# Patient Record
Sex: MALE | URBAN - METROPOLITAN AREA
[De-identification: names, ages, dates, MRNs, and addresses within clinical notes are randomized per-mention and may not be internally consistent; named-entity substitution may affect disease eponyms.]

---

## 2023-02-05 PROBLEM — K13.0 CELLULITIS, LIP: Status: ACTIVE | Noted: 2023-02-05

## 2023-02-05 PROBLEM — L60.0 INGROWN TOENAIL: Status: ACTIVE | Noted: 2023-02-05

## 2023-02-05 PROBLEM — R41.840 ATTENTION DISTURBANCE: Status: ACTIVE | Noted: 2023-02-05

## 2023-02-05 PROBLEM — L03.039 PARONYCHIA OF TOE: Status: ACTIVE | Noted: 2023-02-05

## 2023-02-05 PROBLEM — L30.9 DERMATITIS: Status: ACTIVE | Noted: 2023-02-05

## 2023-02-05 RX ORDER — HYDROCORTISONE VALERATE CREAM 2 MG/G
CREAM TOPICAL
COMMUNITY
Start: 2020-05-26 | End: 2023-03-10 | Stop reason: ALTCHOICE

## 2023-02-05 RX ORDER — DEXTROAMPHETAMINE SACCHARATE, AMPHETAMINE ASPARTATE, DEXTROAMPHETAMINE SULFATE AND AMPHETAMINE SULFATE 2.5; 2.5; 2.5; 2.5 MG/1; MG/1; MG/1; MG/1
10 TABLET ORAL DAILY
COMMUNITY
Start: 2019-07-01

## 2023-02-05 RX ORDER — ESCITALOPRAM OXALATE 10 MG/1
1 TABLET ORAL DAILY
COMMUNITY
Start: 2019-11-26

## 2023-02-05 RX ORDER — NYSTATIN 100000 U/G
CREAM TOPICAL 2 TIMES DAILY
COMMUNITY
Start: 2020-05-13 | End: 2023-03-10

## 2023-03-10 ENCOUNTER — OFFICE VISIT (OUTPATIENT)
Dept: PRIMARY CARE | Facility: CLINIC | Age: 23
End: 2023-03-10
Payer: COMMERCIAL

## 2023-03-10 VITALS
BODY MASS INDEX: 27.12 KG/M2 | HEIGHT: 67 IN | DIASTOLIC BLOOD PRESSURE: 68 MMHG | SYSTOLIC BLOOD PRESSURE: 124 MMHG | WEIGHT: 172.8 LBS | HEART RATE: 88 BPM

## 2023-03-10 DIAGNOSIS — Z00.00 HEALTHCARE MAINTENANCE: Primary | ICD-10-CM

## 2023-03-10 DIAGNOSIS — R05.2 SUBACUTE COUGH: ICD-10-CM

## 2023-03-10 PROBLEM — K13.0 CELLULITIS, LIP: Status: RESOLVED | Noted: 2023-02-05 | Resolved: 2023-03-10

## 2023-03-10 PROBLEM — L03.039 PARONYCHIA OF TOE: Status: RESOLVED | Noted: 2023-02-05 | Resolved: 2023-03-10

## 2023-03-10 PROBLEM — L60.0 INGROWN TOENAIL: Status: RESOLVED | Noted: 2023-02-05 | Resolved: 2023-03-10

## 2023-03-10 PROBLEM — L30.9 DERMATITIS: Status: RESOLVED | Noted: 2023-02-05 | Resolved: 2023-03-10

## 2023-03-10 PROCEDURE — 1036F TOBACCO NON-USER: CPT | Performed by: FAMILY MEDICINE

## 2023-03-10 PROCEDURE — 99395 PREV VISIT EST AGE 18-39: CPT | Performed by: FAMILY MEDICINE

## 2023-03-10 RX ORDER — DEXTROAMPHETAMINE SACCHARATE, AMPHETAMINE ASPARTATE MONOHYDRATE, DEXTROAMPHETAMINE SULFATE AND AMPHETAMINE SULFATE 5; 5; 5; 5 MG/1; MG/1; MG/1; MG/1
20 CAPSULE, EXTENDED RELEASE ORAL EVERY MORNING
COMMUNITY

## 2023-03-10 RX ORDER — ESCITALOPRAM OXALATE 20 MG/1
20 TABLET ORAL DAILY
COMMUNITY

## 2023-03-10 ASSESSMENT — PATIENT HEALTH QUESTIONNAIRE - PHQ9
SUM OF ALL RESPONSES TO PHQ9 QUESTIONS 1 AND 2: 2
1. LITTLE INTEREST OR PLEASURE IN DOING THINGS: SEVERAL DAYS
2. FEELING DOWN, DEPRESSED OR HOPELESS: SEVERAL DAYS

## 2023-03-10 ASSESSMENT — ENCOUNTER SYMPTOMS
APPETITE CHANGE: 0
COLOR CHANGE: 0
CHILLS: 0
COUGH: 0
ARTHRALGIAS: 0
PALPITATIONS: 0
FEVER: 0
ACTIVITY CHANGE: 0
DIARRHEA: 0
FACIAL SWELLING: 0
WHEEZING: 0
EYE DISCHARGE: 0
CONSTIPATION: 0
CONFUSION: 0

## 2023-03-10 NOTE — PROGRESS NOTES
"Subjective   Patient ID: Ezio Abreu is a 22 y.o. male who presents for Annual Exam.    HPI   He comes in for physical exam.  Review of Systems   Constitutional:  Negative for activity change, appetite change, chills and fever.   HENT:  Negative for congestion, ear pain and facial swelling.    Eyes:  Negative for discharge.   Respiratory:  Negative for cough and wheezing.    Cardiovascular:  Negative for chest pain, palpitations and leg swelling.   Gastrointestinal:  Negative for constipation and diarrhea.   Musculoskeletal:  Negative for arthralgias.   Skin:  Negative for color change and pallor.   Neurological:  Negative for syncope.   Psychiatric/Behavioral:  Negative for confusion.        Objective   /68 (BP Location: Right arm)   Pulse 88   Ht 1.695 m (5' 6.75\")   Wt 78.4 kg (172 lb 12.8 oz)   BMI 27.27 kg/m²   BSA Body surface area is 1.92 meters squared.      Physical Exam  Constitutional:       General: He is not in acute distress.     Appearance: Normal appearance. He is not toxic-appearing.   HENT:      Head: Normocephalic.      Right Ear: Tympanic membrane, ear canal and external ear normal.      Left Ear: Tympanic membrane, ear canal and external ear normal.      Nose: Nose normal.      Mouth/Throat:      Pharynx: Oropharynx is clear.   Eyes:      Conjunctiva/sclera: Conjunctivae normal.      Pupils: Pupils are equal, round, and reactive to light.   Cardiovascular:      Rate and Rhythm: Normal rate and regular rhythm.      Pulses: Normal pulses.      Heart sounds: Normal heart sounds.   Pulmonary:      Effort: No respiratory distress.      Breath sounds: No wheezing, rhonchi or rales.   Abdominal:      General: Bowel sounds are normal. There is no distension.      Palpations: Abdomen is soft.      Tenderness: There is no abdominal tenderness.   Musculoskeletal:         General: No swelling or tenderness.      Cervical back: No tenderness.   Skin:     Findings: No lesion or rash. "   Neurological:      General: No focal deficit present.      Mental Status: He is alert and oriented to person, place, and time. Mental status is at baseline.      Gait: Gait normal.   Psychiatric:         Mood and Affect: Mood normal.         Behavior: Behavior normal.         Thought Content: Thought content normal.         Judgment: Judgment normal.       No visits with results within 1 Year(s) from this visit.   Latest known visit with results is:   No results found for any previous visit.     Current Outpatient Medications on File Prior to Visit   Medication Sig Dispense Refill    amphetamine-dextroamphetamine (Adderall) 10 mg tablet Take 1 tablet (10 mg) by mouth once daily.      amphetamine-dextroamphetamine XR (Adderall XR) 20 mg 24 hr capsule Take 1 capsule (20 mg) by mouth once daily in the morning. Do not crush or chew.      escitalopram (Lexapro) 10 mg tablet Take 1 tablet (10 mg) by mouth once daily.      escitalopram (Lexapro) 20 mg tablet Take 1 tablet (20 mg) by mouth once daily.      [DISCONTINUED] hydrocortisone (West-Mariusz) 0.2 % cream Apply sparingly to affected area 2-3 times daily.      [DISCONTINUED] nystatin (Mycostatin) cream twice a day. Apply a thin layer to affected area and rub in well twice daily.       No current facility-administered medications on file prior to visit.     No images are attached to the encounter.            Assessment/Plan   Problem List Items Addressed This Visit    None  Visit Diagnoses       Healthcare maintenance    -  Primary    Relevant Orders    Follow Up In [facility] - PCP    Subacute cough        Relevant Orders    XR chest 2 views

## 2023-12-26 ENCOUNTER — TELEPHONE (OUTPATIENT)
Dept: PRIMARY CARE | Facility: CLINIC | Age: 23
End: 2023-12-26
Payer: COMMERCIAL

## 2023-12-26 DIAGNOSIS — R41.840 ATTENTION DISTURBANCE: ICD-10-CM

## 2023-12-26 NOTE — TELEPHONE ENCOUNTER
Looking for a psychiatrist in the area. Needs a referral.     Out of Adderral due to NP leaving current office. Last seen by them four months ago. Unable to get an appointment - won't return his call.     Let me know of someone that you would have in mind.

## 2024-01-10 ENCOUNTER — APPOINTMENT (OUTPATIENT)
Dept: PRIMARY CARE | Facility: CLINIC | Age: 24
End: 2024-01-10
Payer: COMMERCIAL

## 2025-03-12 ENCOUNTER — TELEPHONE (OUTPATIENT)
Dept: PRIMARY CARE | Facility: CLINIC | Age: 25
End: 2025-03-12
Payer: COMMERCIAL

## 2025-03-13 DIAGNOSIS — Z00.00 HEALTHCARE MAINTENANCE: ICD-10-CM

## 2025-03-27 LAB
ALBUMIN SERPL-MCNC: 4.8 G/DL (ref 3.6–5.1)
ALP SERPL-CCNC: 51 U/L (ref 36–130)
ALT SERPL-CCNC: 54 U/L (ref 9–46)
ANION GAP SERPL CALCULATED.4IONS-SCNC: 11 MMOL/L (CALC) (ref 7–17)
AST SERPL-CCNC: 29 U/L (ref 10–40)
BASOPHILS # BLD AUTO: 70 CELLS/UL (ref 0–200)
BASOPHILS NFR BLD AUTO: 0.8 %
BILIRUB SERPL-MCNC: 0.7 MG/DL (ref 0.2–1.2)
BUN SERPL-MCNC: 17 MG/DL (ref 7–25)
CALCIUM SERPL-MCNC: 9.5 MG/DL (ref 8.6–10.3)
CHLORIDE SERPL-SCNC: 103 MMOL/L (ref 98–110)
CHOLEST SERPL-MCNC: 241 MG/DL
CHOLEST/HDLC SERPL: 6.2 (CALC)
CO2 SERPL-SCNC: 25 MMOL/L (ref 20–32)
CREAT SERPL-MCNC: 1.01 MG/DL (ref 0.6–1.24)
EGFRCR SERPLBLD CKD-EPI 2021: 108 ML/MIN/1.73M2
EOSINOPHIL # BLD AUTO: 475 CELLS/UL (ref 15–500)
EOSINOPHIL NFR BLD AUTO: 5.4 %
ERYTHROCYTE [DISTWIDTH] IN BLOOD BY AUTOMATED COUNT: 12.9 % (ref 11–15)
GLUCOSE SERPL-MCNC: 87 MG/DL (ref 65–99)
HCT VFR BLD AUTO: 48.1 % (ref 38.5–50)
HDLC SERPL-MCNC: 39 MG/DL
HGB BLD-MCNC: 15.8 G/DL (ref 13.2–17.1)
LDLC SERPL CALC-MCNC: 158 MG/DL (CALC)
LYMPHOCYTES # BLD AUTO: 2904 CELLS/UL (ref 850–3900)
LYMPHOCYTES NFR BLD AUTO: 33 %
MCH RBC QN AUTO: 28.3 PG (ref 27–33)
MCHC RBC AUTO-ENTMCNC: 32.8 G/DL (ref 32–36)
MCV RBC AUTO: 86.2 FL (ref 80–100)
MONOCYTES # BLD AUTO: 678 CELLS/UL (ref 200–950)
MONOCYTES NFR BLD AUTO: 7.7 %
NEUTROPHILS # BLD AUTO: 4673 CELLS/UL (ref 1500–7800)
NEUTROPHILS NFR BLD AUTO: 53.1 %
NONHDLC SERPL-MCNC: 202 MG/DL (CALC)
PLATELET # BLD AUTO: 253 THOUSAND/UL (ref 140–400)
PMV BLD REES-ECKER: 11 FL (ref 7.5–12.5)
POTASSIUM SERPL-SCNC: 4.3 MMOL/L (ref 3.5–5.3)
PROT SERPL-MCNC: 7.3 G/DL (ref 6.1–8.1)
RBC # BLD AUTO: 5.58 MILLION/UL (ref 4.2–5.8)
SODIUM SERPL-SCNC: 139 MMOL/L (ref 135–146)
TRIGL SERPL-MCNC: 267 MG/DL
TSH SERPL-ACNC: 1.62 MIU/L (ref 0.4–4.5)
WBC # BLD AUTO: 8.8 THOUSAND/UL (ref 3.8–10.8)

## 2025-04-01 ENCOUNTER — APPOINTMENT (OUTPATIENT)
Dept: PRIMARY CARE | Facility: CLINIC | Age: 25
End: 2025-04-01
Payer: COMMERCIAL

## 2025-04-01 VITALS
SYSTOLIC BLOOD PRESSURE: 128 MMHG | HEART RATE: 87 BPM | WEIGHT: 188.6 LBS | DIASTOLIC BLOOD PRESSURE: 80 MMHG | HEIGHT: 67 IN | BODY MASS INDEX: 29.6 KG/M2

## 2025-04-01 DIAGNOSIS — R41.840 ATTENTION DISTURBANCE: ICD-10-CM

## 2025-04-01 DIAGNOSIS — I73.00 RAYNAUD'S DISEASE WITHOUT GANGRENE: ICD-10-CM

## 2025-04-01 DIAGNOSIS — Z00.00 HEALTHCARE MAINTENANCE: Primary | ICD-10-CM

## 2025-04-01 DIAGNOSIS — R74.8 ELEVATED LIVER ENZYMES: ICD-10-CM

## 2025-04-01 PROCEDURE — 3008F BODY MASS INDEX DOCD: CPT | Performed by: FAMILY MEDICINE

## 2025-04-01 PROCEDURE — 1036F TOBACCO NON-USER: CPT | Performed by: FAMILY MEDICINE

## 2025-04-01 PROCEDURE — 99395 PREV VISIT EST AGE 18-39: CPT | Performed by: FAMILY MEDICINE

## 2025-04-01 RX ORDER — HYDROXYZINE HYDROCHLORIDE 50 MG/1
1 TABLET, FILM COATED ORAL 2 TIMES DAILY PRN
COMMUNITY
Start: 2025-03-02

## 2025-04-01 RX ORDER — BUPROPION HYDROCHLORIDE 300 MG/1
1 TABLET ORAL
COMMUNITY
Start: 2025-03-02

## 2025-04-01 ASSESSMENT — ENCOUNTER SYMPTOMS
CHILLS: 0
OCCASIONAL FEELINGS OF UNSTEADINESS: 0
CONFUSION: 0
LOSS OF SENSATION IN FEET: 0
COUGH: 0
DEPRESSION: 1
PALPITATIONS: 0
COLOR CHANGE: 0
ACTIVITY CHANGE: 0
FEVER: 0
APPETITE CHANGE: 0
DIARRHEA: 0
ARTHRALGIAS: 0
EYE DISCHARGE: 0
WHEEZING: 0
FACIAL SWELLING: 0
CONSTIPATION: 0

## 2025-04-01 ASSESSMENT — PATIENT HEALTH QUESTIONNAIRE - PHQ9
1. LITTLE INTEREST OR PLEASURE IN DOING THINGS: NOT AT ALL
2. FEELING DOWN, DEPRESSED OR HOPELESS: SEVERAL DAYS
10. IF YOU CHECKED OFF ANY PROBLEMS, HOW DIFFICULT HAVE THESE PROBLEMS MADE IT FOR YOU TO DO YOUR WORK, TAKE CARE OF THINGS AT HOME, OR GET ALONG WITH OTHER PEOPLE: NOT DIFFICULT AT ALL
SUM OF ALL RESPONSES TO PHQ9 QUESTIONS 1 AND 2: 1

## 2025-04-01 NOTE — PROGRESS NOTES
"Subjective   Patient ID: Ezio Abreu is a 24 y.o. male who presents for Annual Exam.    HPI     Patient presents for physical exam.    Fam Hx  Mom () living,   Dad () living,   Sister()  Brother ()  1/2 Sister ()     Exercise walks  ETOH 2 x week  Caffeine 10 oz cup coffee/day  Tobacco MJ    Psychiatry, Dr. Workman    Colonoscopy @ 47    Patient denies other complaints.    Review of Systems   Constitutional:  Negative for activity change, appetite change, chills and fever.   HENT:  Negative for congestion, ear pain and facial swelling.    Eyes:  Negative for discharge.   Respiratory:  Negative for cough and wheezing.    Cardiovascular:  Negative for chest pain, palpitations and leg swelling.   Gastrointestinal:  Negative for constipation and diarrhea.   Musculoskeletal:  Negative for arthralgias.   Skin:  Negative for color change and pallor.   Neurological:  Negative for syncope.   Psychiatric/Behavioral:  Negative for confusion.        Objective   /80 (BP Location: Right arm, Patient Position: Sitting)   Pulse 87   Ht 1.695 m (5' 6.75\")   Wt 85.5 kg (188 lb 9.6 oz)   BMI 29.76 kg/m²   BSA Body surface area is 2.01 meters squared.      Physical Exam  Constitutional:       General: He is not in acute distress.     Appearance: Normal appearance. He is not toxic-appearing.   HENT:      Head: Normocephalic.      Right Ear: Tympanic membrane, ear canal and external ear normal.      Left Ear: Tympanic membrane, ear canal and external ear normal.      Nose: Nose normal.      Mouth/Throat:      Pharynx: Oropharynx is clear.   Eyes:      Conjunctiva/sclera: Conjunctivae normal.      Pupils: Pupils are equal, round, and reactive to light.   Cardiovascular:      Rate and Rhythm: Normal rate and regular rhythm.      Pulses: Normal pulses.      Heart sounds: Normal heart sounds.   Pulmonary:      Effort: No respiratory distress.      Breath sounds: No wheezing, rhonchi or rales.   Abdominal:      General: Bowel " sounds are normal. There is no distension.      Palpations: Abdomen is soft.      Tenderness: There is no abdominal tenderness.   Musculoskeletal:         General: No swelling or tenderness.      Cervical back: No tenderness.   Skin:     Findings: No lesion or rash.   Neurological:      General: No focal deficit present.      Mental Status: He is alert and oriented to person, place, and time. Mental status is at baseline.      Gait: Gait normal.   Psychiatric:         Mood and Affect: Mood normal.         Behavior: Behavior normal.         Thought Content: Thought content normal.         Judgment: Judgment normal.       Orders Only on 03/13/2025   Component Date Value Ref Range Status    TSH W/REFLEX TO FT4 03/27/2025 1.62  0.40 - 4.50 mIU/L Final    CHOLESTEROL, TOTAL 03/27/2025 241 (H)  <200 mg/dL Final    HDL CHOLESTEROL 03/27/2025 39 (L)  > OR = 40 mg/dL Final    TRIGLYCERIDES 03/27/2025 267 (H)  <150 mg/dL Final    Comment:    If a non-fasting specimen was collected, consider  repeat triglyceride testing on a fasting specimen  if clinically indicated.   Yuridia et al. J. of Clin. Lipidol. 2015;9:129-169.         LDL-CHOLESTEROL 03/27/2025 158 (H)  mg/dL (calc) Final    Comment: Reference range: <100     Desirable range <100 mg/dL for primary prevention;    <70 mg/dL for patients with CHD or diabetic patients   with > or = 2 CHD risk factors.     LDL-C is now calculated using the Isaiah-Jacquie   calculation, which is a validated novel method providing   better accuracy than the Friedewald equation in the   estimation of LDL-C.   Isaiah SCHWARTZ et al. BART. 2013;310(19): 0097-4268   (http://education.ONtheAIR.tsumobi/faq/YEA130)      CHOL/HDLC RATIO 03/27/2025 6.2 (H)  <5.0 (calc) Final    NON HDL CHOLESTEROL 03/27/2025 202 (H)  <130 mg/dL (calc) Final    Comment: For patients with diabetes plus 1 major ASCVD risk   factor, treating to a non-HDL-C goal of <100 mg/dL   (LDL-C of <70 mg/dL) is considered a  therapeutic   option.      GLUCOSE 03/27/2025 87  65 - 99 mg/dL Final    Comment:               Fasting reference interval         UREA NITROGEN (BUN) 03/27/2025 17  7 - 25 mg/dL Final    CREATININE 03/27/2025 1.01  0.60 - 1.24 mg/dL Final    EGFR 03/27/2025 108  > OR = 60 mL/min/1.73m2 Final    SODIUM 03/27/2025 139  135 - 146 mmol/L Final    POTASSIUM 03/27/2025 4.3  3.5 - 5.3 mmol/L Final    CHLORIDE 03/27/2025 103  98 - 110 mmol/L Final    CARBON DIOXIDE 03/27/2025 25  20 - 32 mmol/L Final    ELECTROLYTE BALANCE 03/27/2025 11  7 - 17 mmol/L (calc) Final    CALCIUM 03/27/2025 9.5  8.6 - 10.3 mg/dL Final    PROTEIN, TOTAL 03/27/2025 7.3  6.1 - 8.1 g/dL Final    ALBUMIN 03/27/2025 4.8  3.6 - 5.1 g/dL Final    BILIRUBIN, TOTAL 03/27/2025 0.7  0.2 - 1.2 mg/dL Final    ALKALINE PHOSPHATASE 03/27/2025 51  36 - 130 U/L Final    AST 03/27/2025 29  10 - 40 U/L Final    ALT 03/27/2025 54 (H)  9 - 46 U/L Final    WHITE BLOOD CELL COUNT 03/27/2025 8.8  3.8 - 10.8 Thousand/uL Final    RED BLOOD CELL COUNT 03/27/2025 5.58  4.20 - 5.80 Million/uL Final    HEMOGLOBIN 03/27/2025 15.8  13.2 - 17.1 g/dL Final    HEMATOCRIT 03/27/2025 48.1  38.5 - 50.0 % Final    MCV 03/27/2025 86.2  80.0 - 100.0 fL Final    MCH 03/27/2025 28.3  27.0 - 33.0 pg Final    MCHC 03/27/2025 32.8  32.0 - 36.0 g/dL Final    Comment: For adults, a slight decrease in the calculated MCHC  value (in the range of 30 to 32 g/dL) is most likely  not clinically significant; however, it should be  interpreted with caution in correlation with other  red cell parameters and the patient's clinical  condition.      RDW 03/27/2025 12.9  11.0 - 15.0 % Final    PLATELET COUNT 03/27/2025 253  140 - 400 Thousand/uL Final    MPV 03/27/2025 11.0  7.5 - 12.5 fL Final    ABSOLUTE NEUTROPHILS 03/27/2025 4,673  1,500 - 7,800 cells/uL Final    ABSOLUTE LYMPHOCYTES 03/27/2025 2,904  850 - 3,900 cells/uL Final    ABSOLUTE MONOCYTES 03/27/2025 678  200 - 950 cells/uL Final    ABSOLUTE  EOSINOPHILS 03/27/2025 475  15 - 500 cells/uL Final    ABSOLUTE BASOPHILS 03/27/2025 70  0 - 200 cells/uL Final    NEUTROPHILS 03/27/2025 53.1  % Final    LYMPHOCYTES 03/27/2025 33.0  % Final    MONOCYTES 03/27/2025 7.7  % Final    EOSINOPHILS 03/27/2025 5.4  % Final    BASOPHILS 03/27/2025 0.8  % Final     Current Outpatient Medications on File Prior to Visit   Medication Sig Dispense Refill    amphetamine-dextroamphetamine (Adderall) 10 mg tablet Take 1 tablet (10 mg) by mouth once daily.      buPROPion XL (Wellbutrin XL) 300 mg 24 hr tablet Take 1 tablet (300 mg) by mouth early in the morning..      hydrOXYzine HCL (Atarax) 50 mg tablet Take 1 tablet (50 mg) by mouth 2 times a day as needed for anxiety.      [DISCONTINUED] amphetamine-dextroamphetamine XR (Adderall XR) 20 mg 24 hr capsule Take 1 capsule (20 mg) by mouth once daily in the morning. Do not crush or chew. (Patient not taking: Reported on 4/1/2025)      [DISCONTINUED] escitalopram (Lexapro) 10 mg tablet Take 1 tablet (10 mg) by mouth once daily. (Patient not taking: Reported on 4/1/2025)      [DISCONTINUED] escitalopram (Lexapro) 20 mg tablet Take 1 tablet (20 mg) by mouth once daily. (Patient not taking: Reported on 4/1/2025)       No current facility-administered medications on file prior to visit.     No images are attached to the encounter.            Assessment/Plan   Problem List Items Addressed This Visit       Attention disturbance     Other Visit Diagnoses       Healthcare maintenance    -  Primary    Elevated liver enzymes        Relevant Orders    US abdomen limited liver    Hepatitis panel, acute    Raynaud's disease without gangrene        Relevant Orders    Referral to Rheumatology          1.  Patient's blood work discussed at this office visit  2.  Patient's LDL goal less than 130 current , start on type II diet continue exercise  3.  Triglyceride goal less than 150 current triglycerides 267 please start on type IV diet  4.   Patient's ALT was elevated we will discuss at this office visit  5.  Colonoscopy at 47  6.  Patient to call for questions or concerns

## 2025-04-21 ENCOUNTER — HOSPITAL ENCOUNTER (OUTPATIENT)
Dept: RADIOLOGY | Facility: CLINIC | Age: 25
Discharge: HOME | End: 2025-04-21
Payer: COMMERCIAL

## 2025-04-21 DIAGNOSIS — R74.8 ELEVATED LIVER ENZYMES: ICD-10-CM

## 2025-04-21 PROCEDURE — 76705 ECHO EXAM OF ABDOMEN: CPT

## 2025-04-21 PROCEDURE — 76705 ECHO EXAM OF ABDOMEN: CPT | Performed by: RADIOLOGY

## 2025-04-22 DIAGNOSIS — K76.0 FATTY LIVER: ICD-10-CM

## 2025-04-22 DIAGNOSIS — R74.8 ELEVATED LIVER ENZYMES: ICD-10-CM

## 2025-05-21 ENCOUNTER — APPOINTMENT (OUTPATIENT)
Facility: CLINIC | Age: 25
End: 2025-05-21
Payer: COMMERCIAL

## 2025-07-28 ENCOUNTER — OFFICE VISIT (OUTPATIENT)
Dept: PRIMARY CARE | Facility: CLINIC | Age: 25
End: 2025-07-28
Payer: COMMERCIAL

## 2025-07-28 VITALS
HEART RATE: 103 BPM | SYSTOLIC BLOOD PRESSURE: 118 MMHG | OXYGEN SATURATION: 98 % | BODY MASS INDEX: 27.3 KG/M2 | DIASTOLIC BLOOD PRESSURE: 58 MMHG | WEIGHT: 173 LBS | TEMPERATURE: 97.5 F

## 2025-07-28 DIAGNOSIS — L60.0 INGROWING TOENAIL OF LEFT FOOT: Primary | ICD-10-CM

## 2025-07-28 PROCEDURE — 99213 OFFICE O/P EST LOW 20 MIN: CPT | Performed by: FAMILY MEDICINE

## 2025-07-28 RX ORDER — DEXTROAMPHETAMINE SACCHARATE, AMPHETAMINE ASPARTATE MONOHYDRATE, DEXTROAMPHETAMINE SULFATE AND AMPHETAMINE SULFATE 2.5; 2.5; 2.5; 2.5 MG/1; MG/1; MG/1; MG/1
10 CAPSULE, EXTENDED RELEASE ORAL
COMMUNITY
Start: 2025-07-17

## 2025-07-28 RX ORDER — MUPIROCIN 20 MG/G
OINTMENT TOPICAL 2 TIMES DAILY
Qty: 22 G | Refills: 0 | Status: SHIPPED | OUTPATIENT
Start: 2025-07-28 | End: 2025-08-07

## 2025-07-28 RX ORDER — ARIPIPRAZOLE 5 MG/1
5 TABLET ORAL
COMMUNITY
Start: 2025-07-26 | End: 2025-08-25

## 2025-07-28 RX ORDER — ACETYLCYSTEINE 600 MG
1200 CAPSULE ORAL 2 TIMES DAILY
COMMUNITY
Start: 2025-07-25 | End: 2025-08-24

## 2025-07-28 RX ORDER — TRAZODONE HYDROCHLORIDE 50 MG/1
50 TABLET ORAL DAILY PRN
COMMUNITY
Start: 2025-07-25 | End: 2025-08-24

## 2025-07-28 RX ORDER — LAMOTRIGINE 25 MG/1
50 TABLET ORAL
COMMUNITY
Start: 2025-07-26 | End: 2025-08-25

## 2025-07-28 ASSESSMENT — ENCOUNTER SYMPTOMS
APPETITE CHANGE: 0
CARDIOVASCULAR NEGATIVE: 1
OCCASIONAL FEELINGS OF UNSTEADINESS: 0
EYES NEGATIVE: 1
WOUND: 1
GASTROINTESTINAL NEGATIVE: 1
LOSS OF SENSATION IN FEET: 0
COLOR CHANGE: 1
DEPRESSION: 1
FATIGUE: 0
RESPIRATORY NEGATIVE: 1

## 2025-07-28 ASSESSMENT — PATIENT HEALTH QUESTIONNAIRE - PHQ9
SUM OF ALL RESPONSES TO PHQ9 QUESTIONS 1 AND 2: 1
1. LITTLE INTEREST OR PLEASURE IN DOING THINGS: NOT AT ALL
2. FEELING DOWN, DEPRESSED OR HOPELESS: SEVERAL DAYS

## 2025-07-28 NOTE — PROGRESS NOTES
Subjective   Patient ID: Ezio Abreu is a 25 y.o. male who presents for ingrown toe nail (both big and pinky toes).    Patient had some ingrowing of the toenails.  Actually they are doing better at this time.  Has been doing some soaks has been watching how he is cutting the nails as well.  On the left toe on the medial aspect of the fifth toe there was some increased swelling of that area and some tenderness    Patient is wearing shoes that are not fitting he had no trauma to the area has been no fever no chills no significant drainage     Both.  Pinky toes hurt.     Review of Systems   Constitutional:  Negative for appetite change and fatigue.   HENT: Negative.     Eyes: Negative.    Respiratory: Negative.     Cardiovascular: Negative.    Gastrointestinal: Negative.    Skin:  Positive for color change and wound.       Objective   /58   Pulse 103   Temp 36.4 °C (97.5 °F)   Wt 78.5 kg (173 lb)   SpO2 98%   BMI 27.30 kg/m²   BSA Body surface area is 1.92 meters squared.      Physical Exam    Examination of the right great toe revealed no significant erythema no warmth minimal tenderness noted.  The fifth toe revealed no significant redness or or swelling.    The left foot the great toe revealed no significant redness or warmth.    The fifth toe revealed some erythema some swelling medial aspect of the left small toe    Using a white area was white.    Using English and both the medial aspect of the left fifth toe nail partially removed.  Patient tolerated this well  Orders Only on 03/13/2025   Component Date Value Ref Range Status    TSH W/REFLEX TO FT4 03/27/2025 1.62  0.40 - 4.50 mIU/L Final    CHOLESTEROL, TOTAL 03/27/2025 241 (H)  <200 mg/dL Final    HDL CHOLESTEROL 03/27/2025 39 (L)  > OR = 40 mg/dL Final    TRIGLYCERIDES 03/27/2025 267 (H)  <150 mg/dL Final    Comment:    If a non-fasting specimen was collected, consider  repeat triglyceride testing on a fasting specimen  if clinically indicated.    Yuridia et al. J. of Clin. Lipidol. 2015;9:129-169.         LDL-CHOLESTEROL 03/27/2025 158 (H)  mg/dL (calc) Final    Comment: Reference range: <100     Desirable range <100 mg/dL for primary prevention;    <70 mg/dL for patients with CHD or diabetic patients   with > or = 2 CHD risk factors.     LDL-C is now calculated using the Isaiah-Dhillon   calculation, which is a validated novel method providing   better accuracy than the Friedewald equation in the   estimation of LDL-C.   Isaiah SCHWARTZ et al. BART. 2013;310(19): 7200-2166   (http://Klout.Netzoptiker/faq/TFH044)      CHOL/HDLC RATIO 03/27/2025 6.2 (H)  <5.0 (calc) Final    NON HDL CHOLESTEROL 03/27/2025 202 (H)  <130 mg/dL (calc) Final    Comment: For patients with diabetes plus 1 major ASCVD risk   factor, treating to a non-HDL-C goal of <100 mg/dL   (LDL-C of <70 mg/dL) is considered a therapeutic   option.      GLUCOSE 03/27/2025 87  65 - 99 mg/dL Final    Comment:               Fasting reference interval         UREA NITROGEN (BUN) 03/27/2025 17  7 - 25 mg/dL Final    CREATININE 03/27/2025 1.01  0.60 - 1.24 mg/dL Final    EGFR 03/27/2025 108  > OR = 60 mL/min/1.73m2 Final    SODIUM 03/27/2025 139  135 - 146 mmol/L Final    POTASSIUM 03/27/2025 4.3  3.5 - 5.3 mmol/L Final    CHLORIDE 03/27/2025 103  98 - 110 mmol/L Final    CARBON DIOXIDE 03/27/2025 25  20 - 32 mmol/L Final    ELECTROLYTE BALANCE 03/27/2025 11  7 - 17 mmol/L (calc) Final    CALCIUM 03/27/2025 9.5  8.6 - 10.3 mg/dL Final    PROTEIN, TOTAL 03/27/2025 7.3  6.1 - 8.1 g/dL Final    ALBUMIN 03/27/2025 4.8  3.6 - 5.1 g/dL Final    BILIRUBIN, TOTAL 03/27/2025 0.7  0.2 - 1.2 mg/dL Final    ALKALINE PHOSPHATASE 03/27/2025 51  36 - 130 U/L Final    AST 03/27/2025 29  10 - 40 U/L Final    ALT 03/27/2025 54 (H)  9 - 46 U/L Final    WHITE BLOOD CELL COUNT 03/27/2025 8.8  3.8 - 10.8 Thousand/uL Final    RED BLOOD CELL COUNT 03/27/2025 5.58  4.20 - 5.80 Million/uL Final    HEMOGLOBIN  03/27/2025 15.8  13.2 - 17.1 g/dL Final    HEMATOCRIT 03/27/2025 48.1  38.5 - 50.0 % Final    MCV 03/27/2025 86.2  80.0 - 100.0 fL Final    MCH 03/27/2025 28.3  27.0 - 33.0 pg Final    MCHC 03/27/2025 32.8  32.0 - 36.0 g/dL Final    Comment: For adults, a slight decrease in the calculated MCHC  value (in the range of 30 to 32 g/dL) is most likely  not clinically significant; however, it should be  interpreted with caution in correlation with other  red cell parameters and the patient's clinical  condition.      RDW 03/27/2025 12.9  11.0 - 15.0 % Final    PLATELET COUNT 03/27/2025 253  140 - 400 Thousand/uL Final    MPV 03/27/2025 11.0  7.5 - 12.5 fL Final    ABSOLUTE NEUTROPHILS 03/27/2025 4,673  1,500 - 7,800 cells/uL Final    ABSOLUTE LYMPHOCYTES 03/27/2025 2,904  850 - 3,900 cells/uL Final    ABSOLUTE MONOCYTES 03/27/2025 678  200 - 950 cells/uL Final    ABSOLUTE EOSINOPHILS 03/27/2025 475  15 - 500 cells/uL Final    ABSOLUTE BASOPHILS 03/27/2025 70  0 - 200 cells/uL Final    NEUTROPHILS 03/27/2025 53.1  % Final    LYMPHOCYTES 03/27/2025 33.0  % Final    MONOCYTES 03/27/2025 7.7  % Final    EOSINOPHILS 03/27/2025 5.4  % Final    BASOPHILS 03/27/2025 0.8  % Final     Medications Ordered Prior to Encounter[1]  No images are attached to the encounter.            Assessment/Plan               [1]   Current Outpatient Medications on File Prior to Visit   Medication Sig Dispense Refill    acetylcysteine 600 mg capsule Take 2 capsules (1,200 mg) by mouth twice a day.      amphetamine-dextroamphetamine XR (Adderall XR) 10 mg 24 hr capsule Take 1 capsule (10 mg) by mouth once daily in the morning. Take before meals.      ARIPiprazole (Abilify) 5 mg tablet Take 1 tablet (5 mg) by mouth once daily.      hydrOXYzine HCL (Atarax) 50 mg tablet Take 1 tablet (50 mg) by mouth 2 times a day as needed for anxiety.      lamoTRIgine (LaMICtal) 25 mg tablet Take 2 tablets (50 mg) by mouth once daily.      traZODone (Desyrel) 50 mg  tablet Take 1 tablet (50 mg) by mouth once daily as needed.      [DISCONTINUED] amphetamine-dextroamphetamine (Adderall) 10 mg tablet Take 1 tablet (10 mg) by mouth once daily.      [DISCONTINUED] buPROPion XL (Wellbutrin XL) 300 mg 24 hr tablet Take 1 tablet (300 mg) by mouth early in the morning..       Current Facility-Administered Medications on File Prior to Visit   Medication Dose Route Frequency Provider Last Rate Last Admin    [DISCONTINUED] GENERIC EXTERNAL MEDICATION     External Data Provider Generic        [DISCONTINUED] GENERIC EXTERNAL MEDICATION     External Data Provider Generic

## 2025-07-28 NOTE — PATIENT INSTRUCTIONS
Going to have you use Bactroban ointment along the nail and cuticle area please do twice daily for the next 7 days and let me know how things are looking.    If any troubles with increasing redness warmth or drainage from the area please call and let us know.

## 2026-04-03 ENCOUNTER — APPOINTMENT (OUTPATIENT)
Dept: PRIMARY CARE | Facility: CLINIC | Age: 26
End: 2026-04-03
Payer: COMMERCIAL